# Patient Record
Sex: MALE | Race: WHITE | NOT HISPANIC OR LATINO
[De-identification: names, ages, dates, MRNs, and addresses within clinical notes are randomized per-mention and may not be internally consistent; named-entity substitution may affect disease eponyms.]

---
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
## 2025-01-07 DIAGNOSIS — F90.2 ATTENTION DEFICIT HYPERACTIVITY DISORDER (ADHD), COMBINED TYPE: ICD-10-CM

## 2025-01-08 RX ORDER — LISDEXAMFETAMINE DIMESYLATE 60 MG/1
60 CAPSULE ORAL DAILY
Qty: 30 CAPSULE | Refills: 0 | Status: SHIPPED | OUTPATIENT
Start: 2025-01-08 | End: 2025-02-07

## 2025-02-02 ENCOUNTER — HOSPITAL ENCOUNTER (EMERGENCY)
Age: 20
Discharge: HOME | End: 2025-02-02
Payer: COMMERCIAL

## 2025-02-02 VITALS
DIASTOLIC BLOOD PRESSURE: 83 MMHG | OXYGEN SATURATION: 99 % | SYSTOLIC BLOOD PRESSURE: 136 MMHG | TEMPERATURE: 99.3 F | HEART RATE: 128 BPM | RESPIRATION RATE: 18 BRPM

## 2025-02-02 DIAGNOSIS — J06.9 ACUTE UPPER RESPIRATORY INFECTION: Primary | ICD-10-CM

## 2025-02-02 LAB
FLUAV AG SPEC QL: NEGATIVE
FLUBV AG SPEC QL: NEGATIVE

## 2025-02-02 PROCEDURE — 99213 OFFICE O/P EST LOW 20 MIN: CPT

## 2025-02-02 PROCEDURE — 87804 INFLUENZA ASSAY W/OPTIC: CPT

## 2025-02-02 RX ORDER — PREDNISONE 20 MG/1
20 TABLET ORAL 2 TIMES DAILY
Qty: 6 TABLET | Refills: 0 | Status: SHIPPED | OUTPATIENT
Start: 2025-02-02 | End: 2025-02-05

## 2025-02-02 RX ORDER — GUAIFENESIN 600 MG/1
600 TABLET, EXTENDED RELEASE ORAL 2 TIMES DAILY
Qty: 30 TABLET | Refills: 0 | Status: SHIPPED | OUTPATIENT
Start: 2025-02-02 | End: 2025-02-17

## 2025-02-02 ASSESSMENT — PAIN DESCRIPTION - PAIN TYPE: TYPE: ACUTE PAIN

## 2025-02-02 ASSESSMENT — PAIN DESCRIPTION - LOCATION: LOCATION: GENERALIZED

## 2025-02-02 ASSESSMENT — PAIN - FUNCTIONAL ASSESSMENT
PAIN_FUNCTIONAL_ASSESSMENT: 0-10
PAIN_FUNCTIONAL_ASSESSMENT: ACTIVITIES ARE NOT PREVENTED

## 2025-02-02 ASSESSMENT — PAIN SCALES - GENERAL: PAINLEVEL_OUTOF10: 2

## 2025-02-02 ASSESSMENT — PAIN DESCRIPTION - FREQUENCY: FREQUENCY: INTERMITTENT

## 2025-02-02 ASSESSMENT — PAIN DESCRIPTION - ONSET: ONSET: GRADUAL

## 2025-02-02 NOTE — ED PROVIDER NOTES
[facility]  Urgent Care Encounter      CHIEF COMPLAINT       Chief Complaint   Patient presents with    Cough    Fever    Generalized Body Aches    Nasal Congestion    Fatigue    Pharyngitis    Ear Pain       Nurses Notes reviewed and I agree except as noted in the HPI.  HISTORY OF PRESENT ILLNESS   Kb Richmond is a 19 y.o. male who presents with 2 days of upper respiratory infection symptoms and concern for influenza.  Denies nausea vomiting or diarrhea or high fever.    REVIEW OF SYSTEMS     Review of Systems   Constitutional:  Positive for activity change, appetite change, chills, fatigue and fever. Negative for diaphoresis.   HENT:  Positive for congestion, rhinorrhea and sore throat. Negative for ear pain and trouble swallowing.    Eyes:  Negative for pain, discharge and redness.   Respiratory:  Positive for cough. Negative for shortness of breath and wheezing.    Cardiovascular: Negative.    Gastrointestinal:  Negative for abdominal pain, constipation, diarrhea, nausea and vomiting.   Endocrine: Negative.    Genitourinary:  Negative for dysuria, frequency and urgency.   Musculoskeletal:  Positive for myalgias. Negative for arthralgias and back pain.   Skin:  Negative for rash.   Allergic/Immunologic: Negative.    Neurological:  Positive for headaches. Negative for dizziness, tremors and weakness.   Hematological: Negative.    Psychiatric/Behavioral:  Negative for dysphoric mood and sleep disturbance. The patient is not nervous/anxious.        PAST MEDICAL HISTORY   History reviewed. No pertinent past medical history.    SURGICAL HISTORY     Patient  has a past surgical history that includes Scrotal surgery (N/A, 9/27/2023).    CURRENT MEDICATIONS       Previous Medications    CHLORPHENIRAMINE MALEATE (ALLERGY RELIEF PO)    Take by mouth    LISDEXAMFETAMINE DIMESYLATE 60 MG CAPS    Take 60 mg by mouth daily for 30 days. Max Daily Amount: 60 mg    TRIAMCINOLONE ACETONIDE ([employer] NASAL ALLERGY SPRAY    Mood and Affect: Mood and affect normal.         Speech: Speech normal.         Behavior: Behavior normal.         Thought Content: Thought content normal.         Cognition and Memory: Cognition and memory normal.         Judgment: Judgment normal.         DIAGNOSTIC RESULTS   Labs:  Results for orders placed or performed during the hospital encounter of 02/02/25   Rapid influenza A/B antigens    Specimen: Nasopharyngeal   Result Value Ref Range    Flu A Antigen Negative NEGATIVE    Flu B Antigen Negative NEGATIVE       IMAGING:  No orders to display      URGENT CARE COURSE:     Vitals:    02/02/25 1424   BP: 136/83   Pulse: (!) 128   Resp: 18   Temp: 99.3 °F (37.4 °C)   TempSrc: Oral   SpO2: 99%       Medications - No data to display  PROCEDURES:  None  FINAL IMPRESSION      1. Acute upper respiratory infection        DISPOSITION/PLAN   DISPOSITION Decision To Discharge 02/02/2025 02:58:23 PM   DISPOSITION CONDITION Stable     Rapid flu swabs are negative.  PATIENT REFERRED TO:  Abhishek Momin APRN - CNP  [address] [address]  [phone]      As needed    DISCHARGE MEDICATIONS:  New Prescriptions    GUAIFENESIN (MUCINEX) 600 MG EXTENDED RELEASE TABLET    Take 1 tablet by mouth 2 times daily for 15 days    PREDNISONE (DELTASONE) 20 MG TABLET    Take 1 tablet by mouth 2 times daily for 3 days     Current Discharge Medication List          JUNIE Mulilgan CNP, Timothy, APRN - CNP  02/02/25 0575

## 2025-02-04 ENCOUNTER — TELEPHONE (OUTPATIENT)
Dept: FAMILY MEDICINE CLINIC | Age: 20
End: 2025-02-04

## 2025-02-04 NOTE — TELEPHONE ENCOUNTER
----- Message from Jeanie Washburn sent at 5/15/2017 11:11 AM CDT -----  Contact: Patient  Denise patient is returning your call. Patients call back number [phone]   Pt called and stated that he went to  and was given Mucinex and Prednisone. Today in class he stated he felt like his BP dropped and was going to black out. He is asking if he might be having areactions to the medication that he is already on with the extra medication that he is taking for the cold.     He stated that at 7am he was feeling fine, by 8am he was starting to feel off and the by 9am is when he had his BP drop and about black out. He stated he took the Mucinex and Prednisone last night around 10p. He stated he is also feeling right now that his ADHD medication is not lasting all day and he gets loopy around 5pm. He is not sure if it is s conflict of all the medications he is on.    Pt currently at a doctor office, he will call us back.     Respiratory distress

## 2025-02-04 NOTE — TELEPHONE ENCOUNTER
Pt informed of stopping the Prednisone. Pt voiced understanding with no further questions at this time.     He will try Claritin and see how he feels with that and call us back if anything else changes.

## 2025-02-04 NOTE — TELEPHONE ENCOUNTER
The prednisone could be interfering with his ADHD meds, I recommend stopping the prednisone, and take claritin if needed. He has a f/u appt with me at the end of the month to f/u on ADHD if he needs to come in sooner he can move the appt up.

## 2025-02-05 ENCOUNTER — TELEPHONE (OUTPATIENT)
Dept: FAMILY MEDICINE CLINIC | Age: 20
End: 2025-02-05

## 2025-02-05 ENCOUNTER — OFFICE VISIT (OUTPATIENT)
Dept: FAMILY MEDICINE CLINIC | Age: 20
End: 2025-02-05
Payer: COMMERCIAL

## 2025-02-05 VITALS
SYSTOLIC BLOOD PRESSURE: 146 MMHG | BODY MASS INDEX: 31.49 KG/M2 | RESPIRATION RATE: 16 BRPM | OXYGEN SATURATION: 98 % | HEIGHT: 68 IN | TEMPERATURE: 97.3 F | WEIGHT: 207.8 LBS | DIASTOLIC BLOOD PRESSURE: 100 MMHG | HEART RATE: 89 BPM

## 2025-02-05 DIAGNOSIS — R51.9 SINUS HEADACHE: Primary | ICD-10-CM

## 2025-02-05 DIAGNOSIS — T50.905A ADVERSE EFFECT OF DRUG, INITIAL ENCOUNTER: ICD-10-CM

## 2025-02-05 DIAGNOSIS — J30.89 NON-SEASONAL ALLERGIC RHINITIS DUE TO OTHER ALLERGIC TRIGGER: ICD-10-CM

## 2025-02-05 PROCEDURE — 99213 OFFICE O/P EST LOW 20 MIN: CPT | Performed by: NURSE PRACTITIONER

## 2025-02-05 RX ORDER — KETOROLAC TROMETHAMINE 10 MG/1
10 TABLET, FILM COATED ORAL EVERY 6 HOURS PRN
Qty: 20 TABLET | Refills: 0 | Status: SHIPPED | OUTPATIENT
Start: 2025-02-05 | End: 2026-02-05

## 2025-02-05 SDOH — ECONOMIC STABILITY: FOOD INSECURITY: WITHIN THE PAST 12 MONTHS, YOU WORRIED THAT YOUR FOOD WOULD RUN OUT BEFORE YOU GOT MONEY TO BUY MORE.: NEVER TRUE

## 2025-02-05 SDOH — ECONOMIC STABILITY: FOOD INSECURITY: WITHIN THE PAST 12 MONTHS, THE FOOD YOU BOUGHT JUST DIDN'T LAST AND YOU DIDN'T HAVE MONEY TO GET MORE.: NEVER TRUE

## 2025-02-05 ASSESSMENT — PATIENT HEALTH QUESTIONNAIRE - PHQ9
SUM OF ALL RESPONSES TO PHQ9 QUESTIONS 1 & 2: 0
SUM OF ALL RESPONSES TO PHQ QUESTIONS 1-9: 0
1. LITTLE INTEREST OR PLEASURE IN DOING THINGS: NOT AT ALL
SUM OF ALL RESPONSES TO PHQ QUESTIONS 1-9: 0
2. FEELING DOWN, DEPRESSED OR HOPELESS: NOT AT ALL

## 2025-02-05 ASSESSMENT — ENCOUNTER SYMPTOMS
RESPIRATORY NEGATIVE: 1
RHINORRHEA: 1
SORE THROAT: 0
SINUS PRESSURE: 1
TROUBLE SWALLOWING: 0
BACK PAIN: 0
VOICE CHANGE: 0
SINUS PAIN: 0

## 2025-02-05 NOTE — TELEPHONE ENCOUNTER
Pt can come in at that time.     Future Appointments   Date Time Provider Department Center   2/5/2025 12:45 PM Abhishek Momin APRN - CNP On license of [facility]   2/26/2025  1:00 PM Abhishek Momin APRN - CNP On license of [facility]

## 2025-02-05 NOTE — TELEPHONE ENCOUNTER
Pt called in and stated that he didn't take the prednisone last night or today. \" I have just been having a massive headache. I'm fine with the breathing. Its just my head that has been pounding. I just want to get checked out \"     Pt would like an appointment today. Offered one tomorrow and he declined stating he wants it today    Has not tried to take anything for his headache.

## 2025-02-05 NOTE — PROGRESS NOTES
[facility]  [address]  [place] [address]  Dept: [phone]  Dept Fax: [phone]  Loc: [phone]    Kb Richmond is a 19 y.o. malewho presents today for his medical conditions/complaints as noted below.  Kb Quiroz c/o of headache (Pounding headache)      :     HPI      Headache    HPI:    Pt went to  on 2/2/2025 with cough fever and congestion, and dx with URI given prednisone , mucinex, and developed a bad H/A so is not taking the prednisone thinks having a reaction to it.     Now having a pounding H/A has not tried any pain relieving meds.     Description of HA - frontal  Symptoms have been present for 2 day(s).  Inciting event? - Yes - URI hx of allergies takes mucinex and OTC allergy med  Severity - 3/10  History of migraines? -  No  Treatment tried and response - none does not want any meds      Fever over 100.5- No  Neck stiffness - No  Focal weakness/paresthesias of arm/leg - No  Nausea/vomiting - No  \"Worst headache ever\" - No  Confusion - No  Photophobia/Phonophobia - No  Changes in vision - No    Has clear nasal drainage      Current Outpatient Medications   Medication Sig Dispense Refill    ketorolac (TORADOL) 10 MG tablet Take 1 tablet by mouth every 6 hours as needed for Pain 20 tablet 0    guaiFENesin (MUCINEX) 600 MG extended release tablet Take 1 tablet by mouth 2 times daily for 15 days 30 tablet 0    predniSONE (DELTASONE) 20 MG tablet Take 1 tablet by mouth 2 times daily for 3 days 6 tablet 0    Lisdexamfetamine Dimesylate 60 MG CAPS Take 60 mg by mouth daily for 30 days. Max Daily Amount: 60 mg 30 capsule 0    Chlorpheniramine Maleate (ALLERGY RELIEF PO) Take by mouth      Triamcinolone Acetonide ([facility] NASAL ALLERGY SPRAY NA) by Nasal route       No current facility-administered medications for this visit.       History reviewed. No pertinent past medical history.   Past Surgical History:   Procedure Laterality Date

## 2025-02-07 DIAGNOSIS — F90.2 ATTENTION DEFICIT HYPERACTIVITY DISORDER (ADHD), COMBINED TYPE: ICD-10-CM

## 2025-02-07 RX ORDER — LISDEXAMFETAMINE DIMESYLATE 60 MG/1
60 CAPSULE ORAL DAILY
Qty: 30 CAPSULE | Refills: 0 | Status: SHIPPED | OUTPATIENT
Start: 2025-02-07 | End: 2025-03-09

## 2025-02-07 NOTE — TELEPHONE ENCOUNTER
Recent Visits  Date Type Provider Dept   02/05/25 Office Visit Momin, Jan, APRN - CNP [facility]   11/26/24 Office Visit Momin, Jan, APRN - CNP [facility]   05/22/24 Office Visit Momin, Jan, APRN - CNP [facility]   02/22/24 Office Visit Momin, Jan, JUNIE - CNP [facility]   01/24/24 Office Visit Momin, Jan, JUNIE - CNP [facility]   11/20/23 Office Visit Momin, Jan, APRN - CNP [facility]   10/02/23 Office Visit Abhishek Momin, APRN - CNP [facility]   08/17/23 Office Visit Momin, Jan, APRN - CNP [facility]   Showing recent visits within past 540 days with a meds authorizing provider and meeting all other requirements  Future Appointments  Date Type Provider Dept   02/26/25 Appointment MerrillAbhishekJUNIE CNP [facility]   Showing future appointments within next 150 days with a meds authorizing provider and meeting all other requirements

## 2025-02-26 ENCOUNTER — OFFICE VISIT (OUTPATIENT)
Dept: FAMILY MEDICINE CLINIC | Age: 20
End: 2025-02-26
Payer: COMMERCIAL

## 2025-02-26 VITALS
BODY MASS INDEX: 31.71 KG/M2 | RESPIRATION RATE: 14 BRPM | WEIGHT: 209.2 LBS | TEMPERATURE: 97.5 F | OXYGEN SATURATION: 99 % | HEART RATE: 94 BPM | SYSTOLIC BLOOD PRESSURE: 142 MMHG | HEIGHT: 68 IN | DIASTOLIC BLOOD PRESSURE: 94 MMHG

## 2025-02-26 DIAGNOSIS — F90.2 ATTENTION DEFICIT HYPERACTIVITY DISORDER (ADHD), COMBINED TYPE: Primary | ICD-10-CM

## 2025-02-26 DIAGNOSIS — Z91.09 ENVIRONMENTAL ALLERGIES: ICD-10-CM

## 2025-02-26 DIAGNOSIS — F91.3 OPPOSITIONAL DISORDER: ICD-10-CM

## 2025-02-26 DIAGNOSIS — F84.0 AUTISM DISORDER: ICD-10-CM

## 2025-02-26 PROCEDURE — 99214 OFFICE O/P EST MOD 30 MIN: CPT | Performed by: NURSE PRACTITIONER

## 2025-02-26 RX ORDER — LISDEXAMFETAMINE DIMESYLATE 60 MG/1
60 CAPSULE ORAL DAILY
Qty: 30 CAPSULE | Refills: 0 | Status: SHIPPED | OUTPATIENT
Start: 2025-07-25 | End: 2025-08-24

## 2025-02-26 RX ORDER — LISDEXAMFETAMINE DIMESYLATE 60 MG/1
60 CAPSULE ORAL DAILY
Qty: 30 CAPSULE | Refills: 0 | Status: SHIPPED | OUTPATIENT
Start: 2025-02-26 | End: 2025-03-28

## 2025-02-26 RX ORDER — LISDEXAMFETAMINE DIMESYLATE 60 MG/1
60 CAPSULE ORAL DAILY
Qty: 30 CAPSULE | Refills: 0 | Status: SHIPPED | OUTPATIENT
Start: 2025-03-28 | End: 2025-04-27

## 2025-02-26 RX ORDER — LISDEXAMFETAMINE DIMESYLATE 60 MG/1
60 CAPSULE ORAL DAILY
Qty: 30 CAPSULE | Refills: 0 | Status: SHIPPED | OUTPATIENT
Start: 2025-04-27 | End: 2025-05-27

## 2025-02-26 RX ORDER — LISDEXAMFETAMINE DIMESYLATE 60 MG/1
60 CAPSULE ORAL DAILY
Qty: 30 CAPSULE | Refills: 0 | Status: SHIPPED | OUTPATIENT
Start: 2025-06-26 | End: 2025-07-26

## 2025-02-26 RX ORDER — LISDEXAMFETAMINE DIMESYLATE 60 MG/1
60 CAPSULE ORAL DAILY
Qty: 30 CAPSULE | Refills: 0 | Status: SHIPPED | OUTPATIENT
Start: 2025-05-28 | End: 2025-06-27

## 2025-02-26 ASSESSMENT — ENCOUNTER SYMPTOMS
GASTROINTESTINAL NEGATIVE: 1
RESPIRATORY NEGATIVE: 1

## 2025-02-26 NOTE — PROGRESS NOTES
[facility]  [address]  [place] [address]  Dept: [phone]  Dept Fax: [phone]  Loc: [phone]    Kb Richmond is a 20 y.o. malewho presents today for his medical conditions/complaints as noted below.  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    But pt has been  requesting med refills for this from me   States 60 mg Is lasting all day now   Last dose of vyvanse today     Denies anxiety or depression.  .    States the vyvanse allows him to focus and concentrate better  Getting B's and C's    Not working     Dx with ODD and autism disorder  Symptoms stable, denies anxiety or MDD  Sleeping well at hs     History of surgery on the tendons of his right fingers    Rhinitis  Has seasonal and environmental allergies takes allergy pills and nasal spray     Last UDS appropriate   Lab Results   Component Value Date    WBC 6.8 09/27/2023    HGB 14.4 09/27/2023    HCT 44.0 09/27/2023    MCV 80.0 09/27/2023     09/27/2023      Lab Results   Component Value Date     09/27/2023    K 4.3 09/27/2023     09/27/2023    CO2 26 09/27/2023    BUN 11 09/27/2023    CREATININE 0.8 09/27/2023    GLUCOSE 102 09/27/2023    CALCIUM 9.3 09/27/2023    LABGLOM >60 09/27/2023         Review of Systems     Current Outpatient Medications   Medication Sig Dispense Refill    ketorolac (TORADOL) 10 MG tablet Take 1 tablet by mouth every 6 hours as needed for Pain 20 tablet 0    Chlorpheniramine Maleate (ALLERGY RELIEF PO)

## 2025-04-15 DIAGNOSIS — F90.2 ATTENTION DEFICIT HYPERACTIVITY DISORDER (ADHD), COMBINED TYPE: ICD-10-CM

## 2025-04-16 RX ORDER — LISDEXAMFETAMINE DIMESYLATE 60 MG/1
60 CAPSULE ORAL DAILY
Qty: 30 CAPSULE | Refills: 0 | Status: SHIPPED | OUTPATIENT
Start: 2025-04-16 | End: 2025-05-16

## 2025-04-16 NOTE — TELEPHONE ENCOUNTER
Recent Visits  Date Type Provider Dept   02/26/25 Office Visit Momin, Jan, JUNIE - CNP [facility]   02/05/25 Office Visit Momin, Jan, JUNIE - CNP [facility]   11/26/24 Office Visit Momin, Jan, JUNIE - CNP [facility]   05/22/24 Office Visit Momin, Jan, JUNIE - CNP [facility]   02/22/24 Office Visit Momin, Jan, JUNIE - CNP [facility]   01/24/24 Office Visit Abhishek Momin, JUNIE - CNP [facility]   11/20/23 Office Visit Abhishek Momin, APRN - CNP [facility]   Showing recent visits within past 540 days with a meds authorizing provider and meeting all other requirements  Future Appointments  Date Type Provider Dept   08/26/25 Appointment Momin, Jan, APRN - CNP [facility]   Showing future appointments within next 150 days with a meds authorizing provider and meeting all other requirements

## 2025-05-16 DIAGNOSIS — F90.2 ATTENTION DEFICIT HYPERACTIVITY DISORDER (ADHD), COMBINED TYPE: ICD-10-CM

## 2025-05-16 RX ORDER — LISDEXAMFETAMINE DIMESYLATE 60 MG/1
60 CAPSULE ORAL DAILY
Qty: 30 CAPSULE | Refills: 0 | Status: SHIPPED | OUTPATIENT
Start: 2025-05-16 | End: 2025-06-15

## 2025-05-16 NOTE — TELEPHONE ENCOUNTER
Controlled Substance Monitoring:    Acute and Chronic Pain Monitoring:   RX Monitoring Periodic Controlled Substance Monitoring   5/16/2025   8:38 AM No signs of potential drug abuse or diversion identified.

## 2025-05-16 NOTE — TELEPHONE ENCOUNTER
Recent Visits  Date Type Provider Dept   02/26/25 Office Visit Abhishek Momin APRN - CNP [facility]   02/05/25 Office Visit Abhishek Momin APRN - CNP [facility]   11/26/24 Office Visit Merrill Abhishek, APRN - CNP [facility]   05/22/24 Office Visit Merrill Abhishek, APRN - CNP [facility]   02/22/24 Office Visit Abhishek Momin APRN - CNP [facility]   01/24/24 Office Visit Merrill Abhishek, APRN - CNP [facility]   Showing recent visits within past 540 days with a meds authorizing provider and meeting all other requirements  Future Appointments  Date Type Provider Dept   08/26/25 Appointment Abhishek Momin APRN - CNP [facility]   Showing future appointments within next 150 days with a meds authorizing provider and meeting all other requirements

## 2025-06-11 DIAGNOSIS — F90.2 ATTENTION DEFICIT HYPERACTIVITY DISORDER (ADHD), COMBINED TYPE: ICD-10-CM

## 2025-06-11 RX ORDER — LISDEXAMFETAMINE DIMESYLATE 60 MG/1
60 CAPSULE ORAL DAILY
Qty: 30 CAPSULE | Refills: 0 | Status: SHIPPED | OUTPATIENT
Start: 2025-06-11 | End: 2025-07-11

## 2025-07-26 DIAGNOSIS — F90.2 ATTENTION DEFICIT HYPERACTIVITY DISORDER (ADHD), COMBINED TYPE: ICD-10-CM

## 2025-07-28 ENCOUNTER — TELEPHONE (OUTPATIENT)
Dept: FAMILY MEDICINE CLINIC | Age: 20
End: 2025-07-28

## 2025-07-28 DIAGNOSIS — F90.9 ATTENTION DEFICIT HYPERACTIVITY DISORDER (ADHD), UNSPECIFIED ADHD TYPE: Primary | ICD-10-CM

## 2025-07-28 RX ORDER — LISDEXAMFETAMINE DIMESYLATE 60 MG/1
60 CAPSULE ORAL DAILY
Qty: 30 CAPSULE | Refills: 0 | Status: SHIPPED | OUTPATIENT
Start: 2025-07-28 | End: 2025-08-27

## 2025-07-28 NOTE — TELEPHONE ENCOUNTER
Patient came to the office and states his insurance will not cover generic Lisdexamfetamine Dimesylate 60 MG CAPS  it has to be the name brand Vyvanse. Please send in new Rx to [facility] [address]     Recent Visits  Date Type Provider Dept   02/26/25 Office Visit Abhishek Momin APRN - CNP [facility]   02/05/25 Office Visit Abhishek Momin APRN - CNP [facility]   11/26/24 Office Visit Abhishek Momin APRN - CNP [facility]   05/22/24 Office Visit Abhishek Momin APRN - CNP [facility]   02/22/24 Office Visit Abhishek Momin APRN - CNP [facility]   Showing recent visits within past 540 days with a meds authorizing provider and meeting all other requirements  Future Appointments  Date Type Provider Dept   08/26/25 Appointment Abhishek Momin APRN - CNP [facility]   Showing future appointments within next 150 days with a meds authorizing provider and meeting all other requirements

## 2025-07-28 NOTE — TELEPHONE ENCOUNTER
Recent Visits  Date Type Provider Dept   02/26/25 Office Visit Merrill Abhishek, APRN - CNP [facility]   02/05/25 Office Visit Merrill Abhishek, APRN - CNP [facility]   11/26/24 Office Visit Merrill Abhishek, APRN - CNP [facility]   05/22/24 Office Visit Merrill Abhishek, APRN - CNP [facility]   02/22/24 Office Visit Abhishek Momin APRN - CNP [facility]   Showing recent visits within past 540 days with a meds authorizing provider and meeting all other requirements  Future Appointments  Date Type Provider Dept   08/26/25 Appointment Abhishek Momin APRN - CNP [facility]   Showing future appointments within next 150 days with a meds authorizing provider and meeting all other requirements

## 2025-08-27 ENCOUNTER — OFFICE VISIT (OUTPATIENT)
Dept: FAMILY MEDICINE CLINIC | Age: 20
End: 2025-08-27
Payer: COMMERCIAL

## 2025-08-27 VITALS
HEIGHT: 68 IN | RESPIRATION RATE: 20 BRPM | SYSTOLIC BLOOD PRESSURE: 132 MMHG | WEIGHT: 215 LBS | TEMPERATURE: 97.9 F | BODY MASS INDEX: 32.58 KG/M2 | HEART RATE: 129 BPM | DIASTOLIC BLOOD PRESSURE: 78 MMHG | OXYGEN SATURATION: 97 %

## 2025-08-27 DIAGNOSIS — F90.2 ATTENTION DEFICIT HYPERACTIVITY DISORDER (ADHD), COMBINED TYPE: Primary | ICD-10-CM

## 2025-08-27 DIAGNOSIS — F84.0 AUTISM DISORDER: ICD-10-CM

## 2025-08-27 DIAGNOSIS — F91.3 OPPOSITIONAL DISORDER: ICD-10-CM

## 2025-08-27 DIAGNOSIS — F90.9 ATTENTION DEFICIT HYPERACTIVITY DISORDER (ADHD), UNSPECIFIED ADHD TYPE: ICD-10-CM

## 2025-08-27 DIAGNOSIS — Z91.09 ENVIRONMENTAL ALLERGIES: ICD-10-CM

## 2025-08-27 PROCEDURE — 99213 OFFICE O/P EST LOW 20 MIN: CPT | Performed by: NURSE PRACTITIONER

## 2025-08-27 RX ORDER — LISDEXAMFETAMINE DIMESYLATE 60 MG/1
60 CAPSULE ORAL DAILY
Qty: 30 CAPSULE | Refills: 0 | Status: SHIPPED | OUTPATIENT
Start: 2025-10-27 | End: 2025-11-26

## 2025-08-27 RX ORDER — LISDEXAMFETAMINE DIMESYLATE 60 MG/1
60 CAPSULE ORAL DAILY
Qty: 30 CAPSULE | Refills: 0 | Status: SHIPPED | OUTPATIENT
Start: 2025-08-27 | End: 2025-09-26

## 2025-08-27 RX ORDER — LISDEXAMFETAMINE DIMESYLATE 60 MG/1
60 CAPSULE ORAL DAILY
Qty: 30 CAPSULE | Refills: 0 | Status: SHIPPED | OUTPATIENT
Start: 2025-09-27 | End: 2025-10-27

## 2025-08-27 ASSESSMENT — ENCOUNTER SYMPTOMS
RESPIRATORY NEGATIVE: 1
GASTROINTESTINAL NEGATIVE: 1

## (undated) DEVICE — DRSG COBAN 4"

## (undated) DEVICE — COTTONBALL LG

## (undated) DEVICE — ZIMMER DERMACARRIER SKIN GRAFT MESH 1.5:1

## (undated) DEVICE — GLV 8 PROTEXIS (CREAM) NEU-THERA

## (undated) DEVICE — SUT CHROMIC 5-0 18" P-13

## (undated) DEVICE — POSITIONER FOAM EGG CRATE ULNAR 2PCS (PINK)

## (undated) DEVICE — BLADE SCALPEL SAFETYLOCK #15

## (undated) DEVICE — PACK HAND

## (undated) DEVICE — DRSG KLING 4"

## (undated) DEVICE — UROLOGY MINOR: Brand: MEDLINE INDUSTRIES, INC.

## (undated) DEVICE — WARMING BLANKET LOWER ADULT

## (undated) DEVICE — DRAIN PENROSE .5" X 18" LATEX

## (undated) DEVICE — PREP BETADINE KIT

## (undated) DEVICE — ZIMMER BLADE DERMATONE

## (undated) DEVICE — SOL IRR POUR NS 0.9% 500ML

## (undated) DEVICE — VENODYNE/SCD SLEEVE CALF MEDIUM

## (undated) DEVICE — ELCTR BOVIE PENCIL HANDPIECE

## (undated) DEVICE — MARKING PEN W RULER

## (undated) DEVICE — ELCTR BOVIE TIP BLADE INSULATED 2.75" EDGE

## (undated) DEVICE — GLOVE ORTHO 8   MSG9480

## (undated) DEVICE — DRAPE MICROSCOPE ZEISS OPMI 41X64"

## (undated) DEVICE — DRSG MASTISOL

## (undated) DEVICE — DRSG CURITY GAUZE SPONGE 4 X 4" 12-PLY

## (undated) DEVICE — SUTURE PERMAHAND SZ 0 L30IN NONABSORBABLE BLK SILK BRAID A306H

## (undated) DEVICE — SOL IRR POUR H2O 250ML

## (undated) DEVICE — MEDICATION LABELS W MARKER

## (undated) DEVICE — SUT CHROMIC 4-0 18" P-12

## (undated) DEVICE — DRAPE INSTRUMENT POUCH 6.75" X 11"

## (undated) DEVICE — SUT HISTOACRYL BLUE

## (undated) DEVICE — DRSG TELFA 3 X 8

## (undated) DEVICE — SUT SOFSILK 2-0 18" C-23

## (undated) DEVICE — DRSG DERMABOND 0.7ML

## (undated) DEVICE — DRSG XEROFORM 5 X 9"

## (undated) DEVICE — SAW BLADE STRYKER MED AGGRESSIVE 9X25X0.38MM

## (undated) DEVICE — MARKER,SKIN,WI/RULER AND LABELS: Brand: MEDLINE

## (undated) DEVICE — SOLUTION IRRIG 1000ML 0.9% SOD CHL USP POUR PLAS BTL

## (undated) DEVICE — BLADE,CARBON-STEEL,10,STRL,DISPOSABLE,TB: Brand: MEDLINE

## (undated) DEVICE — TOURNIQUET ESMARK 4"

## (undated) DEVICE — SUTURE CHROMIC GUT SZ 3-0 L27IN ABSRB BRN L26MM SH 1/2 CIR G122H

## (undated) DEVICE — SPECIMEN CONTAINER 100ML

## (undated) DEVICE — DRSG STERISTRIPS 0.5 X 4"

## (undated) DEVICE — STAPLER SKIN VISI-STAT 35 WIDE

## (undated) DEVICE — DRSG TEGADERM 2.5X3"

## (undated) DEVICE — DRSG COMBINE 5X9"

## (undated) DEVICE — ELCTR BOVIE TIP NEEDLE INSULATED 6.5" EDGE

## (undated) DEVICE — SUT MONOCRYL 3-0 27" PS-2 UNDYED

## (undated) DEVICE — BLADE SCALPEL SAFETYLOCK #10

## (undated) DEVICE — DRAPE,UNDERBUTTOCKS,PCH,STERILE: Brand: MEDLINE

## (undated) DEVICE — PACK MINOR

## (undated) DEVICE — TOURNIQUET CUFF 18" DUAL PORT SINGLE BLADDER W PLC  (BLACK)